# Patient Record
Sex: FEMALE | Race: WHITE | Employment: OTHER | URBAN - METROPOLITAN AREA
[De-identification: names, ages, dates, MRNs, and addresses within clinical notes are randomized per-mention and may not be internally consistent; named-entity substitution may affect disease eponyms.]

---

## 2017-01-12 ENCOUNTER — HOSPITAL ENCOUNTER (OUTPATIENT)
Dept: PHYSICAL THERAPY | Age: 82
Discharge: HOME | End: 2017-01-12
Payer: MEDICARE

## 2017-01-12 PROCEDURE — 97112 NEUROMUSCULAR REEDUCATION: CPT

## 2017-01-12 PROCEDURE — 97161 PT EVAL LOW COMPLEX 20 MIN: CPT

## 2017-01-12 PROCEDURE — G8988 SELF CARE GOAL STATUS: HCPCS

## 2017-01-12 PROCEDURE — G8987 SELF CARE CURRENT STATUS: HCPCS

## 2017-01-12 NOTE — PROGRESS NOTES
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   ===========================================================================================  Assessment / key information:Patient is 80 y.o. yo female who presents to [facility] at [facility] with diagnosis of Muscle wasting and atrophy, not elsewhere classified, other site [M62.58]  Bowel dysfunction [K59.9]. Patient reports lack of urge to have a bowel movement and decreased sensation in the anorectal region. She denies fecal or urinary incontinence. Bowel movement frequency is 1x day with straining. .  Bowel movements either occur during urination or if she gets a vague sensation of fullness and strains. Volume tends to be long and stringy. Recent anorectal manometry showed dyssynergia and impaired sensation. Patient presents to PT with pelvic floor dysfunction. On biofeedback via rectal sensor there was normel resting tone of pelvic floor @ 1.62 microvolts.   There was low net rise of fast twitch contraction @ 7.29 microvolts and low net rise of slow twitch contraction @ 4.79 microvolts. On MMT puborectalis muscle was impaired @ 2/5 and EAS was impaired @ 2/5. PERF score was 22/35 at puborectalis. There was no tenderness to palpation over anorectal muscles. Sensation to touch was intact at anus and rectum. Patient scored 45 on FOTO/Bowel constipation  indicating decreased quality of life. Pt can benefit from PT to increase ease of bowel movements, anorectal awareness and pelvic floor muscle strength/relaxation and for education as to normal toileting techniques.   ==================================================================================  Eval Complexity: History: MEDIUM  Complexity : 1-2 comorbidities / personal factors will impact the outcome/ POC Exam:LOW Complexity : 1-2 Standardized tests and measures addressing body structure, function, activity limitation and / or participation in recreation  Presentation: LOW Complexity : Stable, uncomplicated  Clinical Decision Making:MEDIUM Complexity : FOTO score of 26-74Overall Complexity:LOW   Problem List: Pelvic pain/dysfunction, Decreased pelvic floor mm awareness, Decreased pelvic floor mm strength and Other  Treatment Plan may include any combination of the following: Therapeutic exercise, Neuromuscular re-education, Manual therapy, Physical agent/modality, Patient education and Other  Patient / Family readiness to learn indicated by: asking questions, trying to perform skills and interest  Persons(s) to be included in education: family support person (FSP);list daughter  Barriers to Learning/Limitations: yes;  sensory deficits-vision/hearing/speech  Measures taken: Speak loudly as needed   Patient Goal (s): \"Know when I need to have BM\"   Patient self reported health status: fair  Rehabilitation Potential: good  Short Term Goals: To be accomplished in 4 weeks:   1. Pt performing pelvic floor exercises TID. 2. Pt will report 20% improvement in sensing urge to have bowl movement     3.  Increase net rise of fast twitch contraction to 9 microvolts to facilitate daily bowel movements. Long Term Goals: To be accomplished in 8 weeks:   1. Pt independent with HEP. 2. Pt will report 40% improvement in sensing urge to have bowel movement   3. Increase net rise of fast twitch contraction to 11 microvolts to facilitate daily bowel movements. 4. Pt will score increase score on FOTO/Bowel const to 52 indicating improved ease of bowel movements and improved quality of life. Frequency / Duration: Patient to be seen  1  times per week for 8  weeks:  Patient / Caregiver education and instruction: Exercises and Other Toileting techniques  G-Codes (GP): Self Care:   Current  CK= 40-59%  [id number] Goal  CK= 40-59%. The severity rating is based on the FOTO Score      Therapist Signature: Brittany Akhtar PT Date: 6/71/5835   Certification Period: 1/12/2017 to 4/11/2017 Time: 3:38 PM   ==================================================================================I certify that the above Physical Therapy Services are being furnished while the patient is under my care. I agree with the treatment plan and certify that this therapy is necessary. Physician Signature:        Date:       Time:     Please sign and return to [facility] or you may fax the signed copy to [phone]. Thank you.

## 2017-01-12 NOTE — PROGRESS NOTES
PELVIC FLOOR DAILY TREATMENT NOTE 8-14    Patient Name: Lena Ferrara  Date:2017  : 1927  [x]  Patient  Verified  Payor: Moy Solders / Plan: [place] / Product Type: Medicare /    In time:2:27  Out time:3:25  Total Treatment Time (min): 58  Total Timed Codes (min): 15  1:1 Treatment Time (min): 15   Visit #: 1 of 8    Treatment Area: Bowel dysfunction [K59.9]    SUBJECTIVE  Pain Level (0-10 scale): 0  Any medication changes, allergies to medications, adverse drug reactions, diagnosis change, or new procedure performed?: [x] No    [] Yes (see summary sheet for update)  Subjective functional status/changes:   [] No changes reported  See medical history    OBJECTIVE              15 min Patient Education: [x] Review HEP    [] Progressed/Changed HEP based on: Educated Pt and daughter in pelvic floor anatomy, function/dysfunction and correct execution of a pelvic floor contraction. Reviewed biofeedback results. [] positioning   [] body mechanics   [] transfers   [] heat/ice application        Pain Level (0-10 scale) post treatment: 0    ASSESSMENT/Changes in Function: Justification for Eval Code Complexity:  Patient History : Chronic symptoms, Foraminal stenosis   Examination Internal eval, biofeedback  Clinical Presentation: stable  Clinical Decision Making : [id number]      Patient will continue to benefit from skilled PT services to modify and progress therapeutic interventions, address strength deficits, instruct in home and community integration and address constipation to attain remaining goals. [x]  See Plan of Care  []  See progress note/recertification  []  See Discharge Summary         Progress towards goals / Updated goals:  Initial evaluation completed with home exercise program and education initiated.     PLAN  [x]  Upgrade activities as tolerated     []  Continue plan of care  []  Update interventions per flow sheet       []  Discharge due to:_  []  Other:_ Garland Lopes, PT 1/12/2017  2:27 PM

## 2017-01-24 ENCOUNTER — HOSPITAL ENCOUNTER (OUTPATIENT)
Dept: PHYSICAL THERAPY | Age: 82
Discharge: HOME | End: 2017-01-24
Payer: MEDICARE

## 2017-01-24 PROCEDURE — 97530 THERAPEUTIC ACTIVITIES: CPT

## 2017-01-24 PROCEDURE — 97112 NEUROMUSCULAR REEDUCATION: CPT

## 2017-01-24 NOTE — PROGRESS NOTES
PELVIC FLOOR DAILY TREATMENT NOTE 8-14    Patient Name: Elizabeth Safe  Date:2017  : 1927  [x]  Patient  Verified  Payor: Adelaide Dominguez / Plan: [place] MED[place] & [place] / Product Type: Medicare /    In time:12:45  Out time:3:50  Total Treatment Time (min): 65  Total Timed Codes (min): 65  1:1 Treatment Time (min): 65   Visit #: 2 of 8    Treatment Area: Muscle wasting and atrophy, not elsewhere classified, other site [M62.58]  Bowel dysfunction [K59.9]    SUBJECTIVE  Pain Level (0-10 scale): 0  Any medication changes, allergies to medications, adverse drug reactions, diagnosis change, or new procedure performed?: [x] No    [] Yes (see summary sheet for update)  Subjective functional status/changes:   [] No changes reported  HEP 3x day. No change. Still does not get the urge to go. When BM comes it is still long and thin and she do esn't feel like she fully evacuates. Always has BM when she first urinates upon rising. Strains to try to get as much out as she can. Has 1-2 additional BMs during the day which accompany urination. Drinks 2 cups water and 5 cups coffee daily. Also drinks OJ and lemonade.     OBJECTIVE  Modality rationale: Neuromuscular reeducation to improve the patients ability to have a bowel movement   Min Type Additional Details   47 [x] Biofeedback x 47 minutes    supine and seated rectal    [] Estim: []Att   []Unatt        []TENS instruct                  []IFC  []Premod   []NMES                     []Other:  []w/US   []w/ice   []w/heat  Position:  Location:    []  Traction: [] Cervical       []Lumbar                       [] Prone          []Supine                       []Intermittent   []Continuous Lbs:  [] before manual  [] after manual    []  Ultrasound: []Continuous   [] Pulsed                           []1MHz   []3MHz Location:  W/cm2:    []  Iontophoresis with dexamethasone         Location: [] Take home patch   [] In clinic    []  Ice     []  heat  []  Ice massage Position:  Location:    []  Vasopneumatic Device Pressure:       [] lo [] med [] hi   Temperature: [] lo [] med [] hi   [x] Skin assessment post-treatment:  [x]intact []redness- no adverse reaction       []redness  adverse reaction:       10 min Therapeutic Activity:  [x]  See flow sheet : Water intake, posture for optimum bowel movements, belly breathing, pushing with abdomen while relaxing pelvic floor muscles. Rationale: Improve patient's ability to have a bowel movement     8 min Manual Therapy:  Buddy's massage to EAS and puborectalis muscles bilaterally   Rationale: Improve patient's ability to have a bowel movement             min Patient Education: [x] Review HEP    [] Progressed/Changed HEP based on: Increase water intake by one cup, decrease coffee by one cup. Continue PF exercises in supine and sitting increasing to 7 second holds. [] positioning   [] body mechanics   [] transfers   [] heat/ice application        Other Objective/Functional Measures:    []baseline resting tone: -   [x]slow twitch mms 20. 3(13.3)   [x]fast twitch mms14.4(8.5)    Pain Level (0-10 scale) post treatment: 0    ASSESSMENT/Changes in Function: Patient demonstrates improved strength of pelvic floor muscles with excellent HEP compliance. Patient will continue to benefit from skilled PT services to    to attain remaining goals. []  See Plan of Care  []  See progress note/recertification  []  See Discharge Summary         Progress towards goals / Updated goals:  1. Pt performing pelvic floor exercises TID. met  2. Pt will report 20% improvement in sensing urge to have bowl movement   3.  Increase net rise of fast twitch contraction to 9 microvolts to facilitate daily bowel movements met    PLAN  [x]  Upgrade activities as tolerated     []  Continue plan of care  []  Update interventions per flow sheet       []  Discharge due to:_  []  Other:_      Ludwin Snow, PT 1/24/2017  2:52 PM

## 2017-02-06 ENCOUNTER — HOSPITAL ENCOUNTER (OUTPATIENT)
Dept: PHYSICAL THERAPY | Age: 82
End: 2017-02-06

## 2017-02-21 ENCOUNTER — APPOINTMENT (OUTPATIENT)
Dept: PHYSICAL THERAPY | Age: 82
End: 2017-02-21

## 2017-02-21 NOTE — PROGRESS NOTES
[address] PHYSICAL THERAPY  [address] [facility] [address] [address] - Phone: [phone]  Fax: [phone]  DISCHARGE SUMMARY FOR PHYSICAL THERAPY          Patient Name: Rolly Julien : 1927   Treatment/Medical Diagnosis: Muscle wasting and atrophy, not elsewhere classified, other site [M62.58]  Bowel dysfunction [K59.9]   Onset Date: 2016    Referral Source: Wellington Meléndez MD [facility]): 2017   Prior Hospitalization: See Medical History Provider #: [id number]   Prior Level of Function: Chronic symptoms   Comorbidities: Lumbar foraminal stenosis, Lumbar surgery   Medications: Verified on Patient Summary List   Visits from [facility]: 2 Missed Visits: 0     Goal/Measure of Progress Goal Met? 1. Pt performing pelvic floor exercises TID   Status at last Eval: na Current Status: 3x day yes   2. Pt will report 20% improvement in sensing urge to have bowl movement    Status at last Eval: na Current Status: Unable to reassess no   3. Increase net rise of fast twitch contraction to 9 microvolts to facilitate daily bowel movements   Status at last Eval: 7.29 Current Status: 15.2 yes     Key Functional Changes/Progress: Patient completed 2 of 8 treatments then phoned to discontinue PT in part due to the distance she needed to travel from her home to our clinic. G-Codes (GP): na  Assessments/Recommendations: Discontinue therapy due to lack of attendance or compliance. If you have any questions/comments please contact us directly at  [phone]. Thank you for allowing us to assist in the care of your patient. Therapist Signature:  Yi Nielsen PT Date: 2017   Reporting Period: 2017 to 2017 Time: 2:13 PM

## 2020-09-01 RX ORDER — ESTRADIOL 0.1 MG/G
2 CREAM VAGINAL DAILY
Qty: 42.5 G | Refills: 2 | Status: SHIPPED | OUTPATIENT
Start: 2020-09-01